# Patient Record
Sex: FEMALE
[De-identification: names, ages, dates, MRNs, and addresses within clinical notes are randomized per-mention and may not be internally consistent; named-entity substitution may affect disease eponyms.]

---
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
## 2022-12-12 ENCOUNTER — OFFICE VISIT (OUTPATIENT)
Dept: OBSTETRICS AND GYNECOLOGY | Facility: CLINIC | Age: 22
End: 2022-12-12

## 2022-12-12 VITALS
HEIGHT: 66 IN | BODY MASS INDEX: 40.79 KG/M2 | WEIGHT: 253.8 LBS | SYSTOLIC BLOOD PRESSURE: 118 MMHG | DIASTOLIC BLOOD PRESSURE: 76 MMHG

## 2022-12-12 DIAGNOSIS — Z01.419 WELL WOMAN EXAM: Primary | ICD-10-CM

## 2022-12-12 DIAGNOSIS — E03.9 HYPOTHYROIDISM (ACQUIRED): ICD-10-CM

## 2022-12-12 DIAGNOSIS — Z11.3 SCREENING EXAMINATION FOR STD (SEXUALLY TRANSMITTED DISEASE): ICD-10-CM

## 2022-12-12 LAB
B-HCG UR QL: NEGATIVE
BILIRUB BLD-MCNC: NEGATIVE MG/DL
CLARITY, POC: CLEAR
COLOR UR: YELLOW
EXPIRATION DATE: NORMAL
GLUCOSE UR STRIP-MCNC: NEGATIVE MG/DL
INTERNAL NEGATIVE CONTROL: NORMAL
INTERNAL POSITIVE CONTROL: NORMAL
KETONES UR QL: NEGATIVE
LEUKOCYTE EST, POC: NEGATIVE
Lab: NORMAL
NITRITE UR-MCNC: NEGATIVE MG/ML
PH UR: 5 [PH] (ref 5–8)
PROT UR STRIP-MCNC: NEGATIVE MG/DL
RBC # UR STRIP: NEGATIVE /UL
SP GR UR: 1 (ref 1–1.03)
UROBILINOGEN UR QL: NORMAL

## 2022-12-12 PROCEDURE — 81025 URINE PREGNANCY TEST: CPT | Performed by: OBSTETRICS & GYNECOLOGY

## 2022-12-12 PROCEDURE — 2014F MENTAL STATUS ASSESS: CPT | Performed by: OBSTETRICS & GYNECOLOGY

## 2022-12-12 PROCEDURE — 81002 URINALYSIS NONAUTO W/O SCOPE: CPT | Performed by: OBSTETRICS & GYNECOLOGY

## 2022-12-12 PROCEDURE — 3008F BODY MASS INDEX DOCD: CPT | Performed by: OBSTETRICS & GYNECOLOGY

## 2022-12-12 PROCEDURE — 99385 PREV VISIT NEW AGE 18-39: CPT | Performed by: OBSTETRICS & GYNECOLOGY

## 2022-12-12 RX ORDER — LEVOTHYROXINE SODIUM 0.12 MG/1
125 TABLET ORAL DAILY
COMMUNITY
Start: 2022-11-13

## 2022-12-12 NOTE — PROGRESS NOTES
"GYN Annual Exam     CC- Here for annual exam   Chief Complaint   Patient presents with   • Gynecologic Exam       Pt new to practice? Yes  Pt new to me? Yes     Zach Ybarra is a 22 y.o.  female who presents for annual well woman exam. Patient's last menstrual period was 2022 (exact date).    Problems in addition to need for annual: wants to establish care    HPI: History of Present Illness    PMHX:  Patient Active Problem List   Diagnosis   • RLQ abdominal pain   • Hypothyroidism (acquired)   ; otherwise none    OB History        0    Para   0    Term   0       0    AB   0    Living   0       SAB   0    IAB   0    Ectopic   0    Molar   0    Multiple   0    Live Births   0                  Past Medical History:   Diagnosis Date   • Disease of thyroid gland        Past Surgical History:   Procedure Laterality Date   • APPENDECTOMY N/A 10/15/2020    Procedure: APPENDECTOMY LAPAROSCOPIC;  Surgeon: Esha Magana MD;  Location: [facility];  Service: General;  Laterality: N/A;   • WISDOM TOOTH EXTRACTION           Current Outpatient Medications:   •  levothyroxine (SYNTHROID, LEVOTHROID) 125 MCG tablet, Take 125 mcg by mouth Daily., Disp: , Rfl:     No Known Allergies    Social History     Tobacco Use   • Smoking status: Never   • Smokeless tobacco: Never   Substance Use Topics   • Alcohol use: Never   • Drug use: Never       Zach Ybarra  reports that she has never smoked. She has never used smokeless tobacco..           History reviewed. No pertinent family history.    Review of Systems    Patient reports that she is not currently experiencing any symptoms of urinary incontinence.      yesTESTED FOR CHLAMYDIA?    EXAM:  /76   Ht 167.6 cm (66\")   Wt 115 kg (253 lb 12.8 oz)   LMP 2022 (Exact Date)   BMI 40.96 kg/m²     Labs:   Lab Results (last 24 hours)     Procedure Component Value Units Date/Time    POC Pregnancy, Urine [[id number]]  (Normal) Collected: 22 " 1058    Specimen: Urine Updated: 12/12/22 1058     HCG, Urine, QL Negative     Lot Number [id number]     Internal Positive Control Passed     Internal Negative Control Passed     Expiration Date 1/31/24    POC Urinalysis Dipstick [[id number]]  (Normal) Collected: 12/12/22 1058    Specimen: Urine Updated: 12/12/22 1058     Color Yellow     Clarity, UA Clear     Glucose, UA Negative mg/dL      Bilirubin Negative     Ketones, UA Negative     Specific Gravity  1.005     Blood, UA Negative     pH, Urine 5.0     Protein, POC Negative mg/dL      Urobilinogen, UA Normal     Leukocytes Negative     Nitrite, UA Negative          Physical Exam  Vitals and nursing note reviewed.   Constitutional:       Appearance: She is well-developed.   HENT:      Head: Normocephalic and atraumatic.   Cardiovascular:      Rate and Rhythm: Normal rate.   Pulmonary:      Effort: Pulmonary effort is normal.   Abdominal:      General: There is no distension.      Palpations: Abdomen is soft. There is no mass.      Tenderness: There is no abdominal tenderness. There is no guarding.   Genitourinary:     Vagina: No vaginal discharge.   Musculoskeletal:         General: No tenderness or deformity. Normal range of motion.      Cervical back: Normal range of motion.   Skin:     General: Skin is warm and dry.      Coloration: Skin is not pale.      Findings: No erythema or rash.   Neurological:      Mental Status: She is alert and oriented to person, place, and time.   Psychiatric:         Behavior: Behavior normal.         Thought Content: Thought content normal.         Judgment: Judgment normal.            As part of wellness and prevention, the following topics were discussed with the patient: healthy weight, substance abuse/misuse, mental health, encouraging self breast exam, and other counseling and guidance done:  Nutrition, physical activity, healthy weight, injury prevention, misuse of tobacco, alcohol and drugs, sexual behavior and STDs,  contraception, dental health, mental health, immunizations breast cancer screening and exams.    I saw the patient with a face mask, gloves and eye protection  The patient herself was masked.  Social distancing was observed as appropriate. All COVID precautions observed.  Pt encouraged to receive COVID vaccine if she hadn't already done this.     Assessment     1) GYN annual well woman exam.   2) PAP done today? No  3) problems addressed: none               Plan       Follow up prn or one year.    Pt instructed to call for results of any testing done today and that failure to call if she has not heard from us could result in inadequate treatment.  Pt verbalized her understanding.     Diagnoses and all orders for this visit:    1. Well woman exam (Primary)  -     POC Urinalysis Dipstick  -     POC Pregnancy, Urine  -     Chlamydia trachomatis, Neisseria gonorrhoeae, Trichomonas vaginalis, PCR - Urine, Urine, Clean Catch    2. Screening examination for STD (sexually transmitted disease)  -     Chlamydia trachomatis, Neisseria gonorrhoeae, Trichomonas vaginalis, PCR - Urine, Urine, Clean Catch    3. Hypothyroidism (acquired)        RTO Return in about 1 year (around 12/12/2023) for Annual physical.     Contraception counseling done.        Dino Wilson MD  12/12/22  11:09 EST

## 2022-12-14 LAB
C TRACH RRNA SPEC QL NAA+PROBE: NEGATIVE
N GONORRHOEA RRNA SPEC QL NAA+PROBE: NEGATIVE
T VAGINALIS RRNA SPEC QL NAA+PROBE: NEGATIVE

## (undated) DEVICE — GLV SURG SENSICARE ORTHO PF LF 6.5 STRL

## (undated) DEVICE — ENDOPATH XCEL BLADELESS TROCARS WITH STABILITY SLEEVES: Brand: ENDOPATH XCEL

## (undated) DEVICE — DISPOSABLE GRASPER CARTRIDGE: Brand: DIRECT DRIVE REPOSABLE GRASPERS

## (undated) DEVICE — LAPAROSCOPIC SMOKE FILTRATION SYSTEM: Brand: PALL LAPAROSHIELD® PLUS LAPAROSCOPIC SMOKE FILTRATION SYSTEM

## (undated) DEVICE — SKIN AFFIX SURG ADHESIVE 72/CS 0.55ML: Brand: MEDLINE

## (undated) DEVICE — ENDOPOUCH RETRIEVER SPECIMEN RETRIEVAL BAGS: Brand: ENDOPOUCH RETRIEVER

## (undated) DEVICE — TBG INSUFL W FLTR STRL

## (undated) DEVICE — ENDOPATH PNEUMONEEDLE INSUFFLATION NEEDLES WITH LUER LOCK CONNECTORS 120MM: Brand: ENDOPATH

## (undated) DEVICE — ENDOCUT SCISSOR TIP, DISPOSABLE: Brand: RENEW

## (undated) DEVICE — PK LAP GEN 90

## (undated) DEVICE — ENDOPATH XCEL UNIVERSAL TROCAR STABLILITY SLEEVES: Brand: ENDOPATH XCEL

## (undated) DEVICE — APPL CHLORAPREP W/TINT 26ML ORNG

## (undated) DEVICE — 2, DISPOSABLE SUCTION/IRRIGATOR WITH DISPOSABLE TIP: Brand: STRYKEFLOW

## (undated) DEVICE — ECHELON FLEX45 ENDOPATH STAPLER, ARTICULATING ENDOSCOPIC LINEAR CUTTER (NO CARTRIDGE): Brand: ECHELON ENDOPATH

## (undated) DEVICE — SUCTION CANISTER, 1000CC,SAFELINER: Brand: DEROYAL

## (undated) DEVICE — SUT VIC 0 TN 27IN DYED JTN0G

## (undated) DEVICE — UNDYED BRAIDED (POLYGLACTIN 910), SYNTHETIC ABSORBABLE SUTURE: Brand: COATED VICRYL